# Patient Record
Sex: MALE | Race: WHITE | Employment: FULL TIME | URBAN - METROPOLITAN AREA
[De-identification: names, ages, dates, MRNs, and addresses within clinical notes are randomized per-mention and may not be internally consistent; named-entity substitution may affect disease eponyms.]

---

## 2018-09-28 PROBLEM — L40.9 PSORIASIS: Status: ACTIVE | Noted: 2018-09-28

## 2019-02-21 PROBLEM — M25.512 CHRONIC LEFT SHOULDER PAIN: Status: ACTIVE | Noted: 2019-02-21

## 2019-02-21 PROBLEM — G89.29 CHRONIC LEFT SHOULDER PAIN: Status: ACTIVE | Noted: 2019-02-21

## 2019-04-04 PROBLEM — D03.39 MELANOMA IN SITU OF NOSE (HCC): Status: ACTIVE | Noted: 2019-04-04

## 2019-04-17 ENCOUNTER — HOSPITAL ENCOUNTER (OUTPATIENT)
Age: 64
Setting detail: SPECIMEN
Discharge: HOME | End: 2019-04-17
Payer: COMMERCIAL

## 2019-04-17 ENCOUNTER — HOSPITAL ENCOUNTER (OUTPATIENT)
Facility: CLINIC | Age: 64
Setting detail: OUTPATIENT SURGERY
Discharge: HOME | End: 2019-04-17
Attending: PLASTIC SURGERY | Admitting: PLASTIC SURGERY
Payer: COMMERCIAL

## 2019-04-17 VITALS
BODY MASS INDEX: 38.36 KG/M2 | TEMPERATURE: 98.2 F | HEART RATE: 82 BPM | RESPIRATION RATE: 14 BRPM | DIASTOLIC BLOOD PRESSURE: 90 MMHG | SYSTOLIC BLOOD PRESSURE: 140 MMHG | OXYGEN SATURATION: 94 % | HEIGHT: 76 IN | WEIGHT: 315 LBS

## 2019-04-17 PROCEDURE — 7100000011 HC PHASE II RECOVERY - ADDTL 15 MIN: Performed by: PLASTIC SURGERY

## 2019-04-17 PROCEDURE — 2709999900 HC NON-CHARGEABLE SUPPLY: Performed by: PLASTIC SURGERY

## 2019-04-17 PROCEDURE — 3600000012 HC SURGERY LEVEL 2 ADDTL 15MIN: Performed by: PLASTIC SURGERY

## 2019-04-17 PROCEDURE — 3600000002 HC SURGERY LEVEL 2 BASE: Performed by: PLASTIC SURGERY

## 2019-04-17 PROCEDURE — 7100000010 HC PHASE II RECOVERY - FIRST 15 MIN: Performed by: PLASTIC SURGERY

## 2019-04-17 PROCEDURE — 2500000003 HC RX 250 WO HCPCS: Performed by: PLASTIC SURGERY

## 2019-04-17 ASSESSMENT — PAIN SCALES - GENERAL: PAINLEVEL_OUTOF10: 0

## 2019-04-17 ASSESSMENT — PAIN - FUNCTIONAL ASSESSMENT: PAIN_FUNCTIONAL_ASSESSMENT: 0-10

## 2019-04-17 NOTE — H&P
Subjective:      Patient ID: Erica Murguia is a 59 y.o. male.     HPI  Patient returns today after an absence, reporting lesions or masses of chest. One of these has been present for about 15 years and the rest have only been present 6 months. They don't hurt, bleed, ooze, or drain. He states, \"If I couldn't feel them with my fingers, I wouldn't know they are there. \" Patient reports a personal history of skin cancer on the nose. The path report, from 7/1/2016 called this an \"atypical lentiginous melanocytic hyperplasia. 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 Provider   Hypertension   Provider   Melanoma in situ of nose ([facility] [address] 06/17/2016 per path report Provider   Osteoarthritis of right shoulder 2/27/2016  Provider   Osteoarthritis of shoulder 2/27/2016  Provider   Primary osteoarthritis of left knee 2/27/2016  Provider   Primary osteoarthritis of right knee 2/27/2016  Provider   Sleep apnea   Provider   Trapezius muscle strain 5/27/2016  Provider   Family History     Problem Relation Age of Onset Comments   Cancer Mother     Stroke Father     Social History     Tobacco History     Smoking Status  Never Smoker   Smokeless Tobacco Use  Never Used   Alcohol History     Alcohol Use Status  No   Drug Use     Drug Use Status  No   Sexual Activity     Sexually Active  Not Asked    Surgical History     Procedure Laterality Date Comment Source   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 Sanam Pena Provider   WRIST SURGERY Right   Provider   E-Cigarettes Vaping or Juuling     Questions   E-Cigarette Use   Start Date   Does device contain nicotine? Quit Date   E-Cigarette Type   Socioeconomic History     Employment History     No employment history on file.    Family Plan:   Schedule excisions. I have discussed with the patient the indication, alternatives, and the possible risks and/or complications which include but are not limited to those delineated on the consent form for the planned procedure and the anesthesia methods. All questions were answered to patient's satisfaction. Consent was reviewed and signed.

## 2019-04-18 NOTE — OP NOTE
[address] [facility]                                OPERATIVE REPORT    PATIENT NAME: Georgina Prado                     :        1955  MED REC NO:   [id number]                             ROOM:  ACCOUNT NO:   [de-identified]                           ADMIT DATE: 2019  PROVIDER:     Jacquelyn Fowler    DATE OF PROCEDURE:  2019    ATTENDING PHYSICIAN AND SURGEON:  Jacquelyn Fowler MD    PREOPERATIVE DIAGNOSIS:  Multiple masses of chest and mass of right  lower abdomen. POSTOPERATIVE DIAGNOSIS:  Multiple masses of chest and mass of right  lower abdomen. PROCEDURES:  1. Excision of mass of right chest, lateral, (1.0 cm); intermediate  repair of 1.0 cm length. 2.  Excision of mass of right chest, medial, (2.0 cm), with intermediate  repair of 2.0 cm length. 3.  Excision of mass, left chest, upper, (1.5 cm), with intermediate  repair of 1.5 cm length. 4.  Excision of mass of left chest, mid, (2.0 cm); intermediate repair  of 2.0 cm length. 5.  Excision of mass of right chest, lower, (1.5 cm); intermediate  repair of 1.5 cm length. 6.  Excision of mass of right lower abdomen (1.5 cm), with intermediate  repair of 1.5 cm length. ANESTHESIA:  Local 1% Xylocaine with epinephrine, buffered. INDICATIONS:  The patient is a 31-year-old male with multiple bothersome  masses as noted above. The procedure, the risks, the benefits were  discussed with him. All questions were answered to his satisfaction. Consent was signed. NARRATIVE SUMMARY:  The patient was brought to the operating suite,  placed in a lawn-chair position. Each area was then prepped and draped  in the usual sterile fashion. Local anesthetic was infiltrated into  each area.     Next, using a scalpel, a transverse incision was made over the crest of  each mass, and then with sharp and blunt scissor dissection, dissection  was taken deep, and each mass was carefully dissected from the  surrounding normal tissues. They were sent all individually as  specimens. Bovie cautery was used for hemostasis. Wounds were closed  in layers with interrupted 4-0 Vicryl in the subcutaneous and running  intracuticular 4-0 Prolene in the skin. Steri-Strips for dressing. The  patient tolerated the procedure well. Blood loss minimal.  Specimens  x6. The patient was transferred to the recovery room in stable  condition.         Stefan Slaughter    D: 04/17/2019 16:08:17       T: 04/18/2019 2:21:33     [email or web address]  Job#: [id number]     Doc#: [id number]    CC:

## 2019-04-19 LAB — SURGICAL PATHOLOGY REPORT: NORMAL

## 2019-06-13 PROBLEM — D17.9 LIPOMA: Status: ACTIVE | Noted: 2019-06-13

## 2019-06-13 PROBLEM — M25.569 KNEE PAIN: Status: ACTIVE | Noted: 2019-06-13

## 2019-06-21 ENCOUNTER — HOSPITAL ENCOUNTER (OUTPATIENT)
Dept: GENERAL RADIOLOGY | Facility: CLINIC | Age: 64
Discharge: HOME | End: 2019-06-23
Payer: COMMERCIAL

## 2019-06-21 DIAGNOSIS — R07.81 RIB PAIN: ICD-10-CM

## 2019-06-21 PROCEDURE — 71046 X-RAY EXAM CHEST 2 VIEWS: CPT

## 2019-08-30 PROBLEM — R60.0 BILATERAL LEG EDEMA: Status: ACTIVE | Noted: 2019-08-30

## 2025-07-15 ENCOUNTER — HOSPITAL ENCOUNTER (OUTPATIENT)
Dept: GENERAL RADIOLOGY | Facility: CLINIC | Age: 70
Discharge: HOME | End: 2025-07-17
Payer: MEDICARE

## 2025-07-15 DIAGNOSIS — M54.41 CHRONIC BILATERAL LOW BACK PAIN WITH SCIATICA, SCIATICA LATERALITY UNSPECIFIED: ICD-10-CM

## 2025-07-15 DIAGNOSIS — M54.42 CHRONIC BILATERAL LOW BACK PAIN WITH SCIATICA, SCIATICA LATERALITY UNSPECIFIED: ICD-10-CM

## 2025-07-15 DIAGNOSIS — G89.29 CHRONIC BILATERAL LOW BACK PAIN WITH SCIATICA, SCIATICA LATERALITY UNSPECIFIED: ICD-10-CM

## 2025-07-15 PROCEDURE — 72100 X-RAY EXAM L-S SPINE 2/3 VWS: CPT

## (undated) DEVICE — SUTURE PROL SZ 4-0 L18IN NONABSORBABLE BLU L16MM PC-3 3/8 8634G

## (undated) DEVICE — ARROWHEAD LOCAL PACK: Brand: MEDLINE INDUSTRIES, INC.

## (undated) DEVICE — INTENDED FOR TISSUE SEPARATION, AND OTHER PROCEDURES THAT REQUIRE A SHARP SURGICAL BLADE TO PUNCTURE OR CUT.: Brand: BARD-PARKER ® SAFETYLOCK CARBON RIB-BACK BLADES

## (undated) DEVICE — NEEDLE HYPO 25GA L1.5IN BLU POLYPR HUB S STL REG BVL STR

## (undated) DEVICE — 3M™ STERI-STRIP™ REINFORCED ADHESIVE SKIN CLOSURES, R1547, 1/2 IN X 4 IN (12 MM X 100 MM), 6 STRIPS/ENVELOPE: Brand: 3M™ STERI-STRIP™

## (undated) DEVICE — SOLUTION IV IRRIG 500ML 0.9% SODIUM CHL 2F7123

## (undated) DEVICE — ENCORE® LATEX ACCLAIM SIZE 7.5, STERILE LATEX POWDER-FREE SURGICAL GLOVE: Brand: ENCORE

## (undated) DEVICE — SUTURE VCRL SZ 4-0 L18IN ABSRB UD L13MM P-3 3/8 CIR PRIM J494H